# Patient Record
Sex: MALE | Race: WHITE | HISPANIC OR LATINO
[De-identification: names, ages, dates, MRNs, and addresses within clinical notes are randomized per-mention and may not be internally consistent; named-entity substitution may affect disease eponyms.]

---

## 2017-03-20 ENCOUNTER — APPOINTMENT (OUTPATIENT)
Dept: PULMONOLOGY | Facility: CLINIC | Age: 47
End: 2017-03-20

## 2017-03-20 VITALS
WEIGHT: 226 LBS | DIASTOLIC BLOOD PRESSURE: 71 MMHG | HEIGHT: 67 IN | BODY MASS INDEX: 35.47 KG/M2 | OXYGEN SATURATION: 98 % | RESPIRATION RATE: 16 BRPM | SYSTOLIC BLOOD PRESSURE: 110 MMHG | HEART RATE: 72 BPM | TEMPERATURE: 97.4 F

## 2017-03-20 DIAGNOSIS — Q76.1 KLIPPEL-FEIL SYNDROME: ICD-10-CM

## 2017-03-20 DIAGNOSIS — R07.89 OTHER CHEST PAIN: ICD-10-CM

## 2017-03-20 DIAGNOSIS — Z87.891 PERSONAL HISTORY OF NICOTINE DEPENDENCE: ICD-10-CM

## 2017-03-20 DIAGNOSIS — R03.0 ELEVATED BLOOD-PRESSURE READING, W/OUT DIAGNOSIS OF HYPERTENSION: ICD-10-CM

## 2017-03-20 DIAGNOSIS — R73.03 PREDIABETES.: ICD-10-CM

## 2017-03-20 DIAGNOSIS — Z82.49 FAMILY HISTORY OF ISCHEMIC HEART DISEASE AND OTHER DISEASES OF THE CIRCULATORY SYSTEM: ICD-10-CM

## 2017-03-20 DIAGNOSIS — G47.33 OBSTRUCTIVE SLEEP APNEA (ADULT) (PEDIATRIC): ICD-10-CM

## 2017-03-20 DIAGNOSIS — E66.01 MORBID (SEVERE) OBESITY DUE TO EXCESS CALORIES: ICD-10-CM

## 2017-04-10 ENCOUNTER — APPOINTMENT (OUTPATIENT)
Dept: CARDIOLOGY | Facility: CLINIC | Age: 47
End: 2017-04-10

## 2017-04-10 ENCOUNTER — OUTPATIENT (OUTPATIENT)
Dept: OUTPATIENT SERVICES | Facility: HOSPITAL | Age: 47
LOS: 1 days | End: 2017-04-10
Payer: COMMERCIAL

## 2017-04-10 DIAGNOSIS — Z00.8 ENCOUNTER FOR OTHER GENERAL EXAMINATION: ICD-10-CM

## 2017-04-10 DIAGNOSIS — Z98.89 OTHER SPECIFIED POSTPROCEDURAL STATES: Chronic | ICD-10-CM

## 2017-04-10 DIAGNOSIS — I25.10 ATHEROSCLEROTIC HEART DISEASE OF NATIVE CORONARY ARTERY WITHOUT ANGINA PECTORIS: ICD-10-CM

## 2017-04-10 PROCEDURE — 75574 CT ANGIO HRT W/3D IMAGE: CPT

## 2017-04-10 PROCEDURE — 75574 CT ANGIO HRT W/3D IMAGE: CPT | Mod: 26

## 2017-05-26 ENCOUNTER — OUTPATIENT (OUTPATIENT)
Dept: OUTPATIENT SERVICES | Facility: HOSPITAL | Age: 47
LOS: 1 days | End: 2017-05-26
Payer: COMMERCIAL

## 2017-05-26 ENCOUNTER — APPOINTMENT (OUTPATIENT)
Dept: SLEEP CENTER | Facility: CLINIC | Age: 47
End: 2017-05-26

## 2017-05-26 DIAGNOSIS — Z98.89 OTHER SPECIFIED POSTPROCEDURAL STATES: Chronic | ICD-10-CM

## 2017-05-26 PROCEDURE — G0399: CPT

## 2017-05-31 ENCOUNTER — RESULT REVIEW (OUTPATIENT)
Age: 47
End: 2017-05-31

## 2017-06-01 DIAGNOSIS — G47.33 OBSTRUCTIVE SLEEP APNEA (ADULT) (PEDIATRIC): ICD-10-CM

## 2023-12-06 ENCOUNTER — APPOINTMENT (OUTPATIENT)
Dept: PAIN MANAGEMENT | Facility: CLINIC | Age: 53
End: 2023-12-06
Payer: COMMERCIAL

## 2023-12-06 VITALS
HEART RATE: 61 BPM | WEIGHT: 192 LBS | HEIGHT: 67 IN | BODY MASS INDEX: 30.13 KG/M2 | DIASTOLIC BLOOD PRESSURE: 86 MMHG | SYSTOLIC BLOOD PRESSURE: 130 MMHG

## 2023-12-06 DIAGNOSIS — M54.12 RADICULOPATHY, CERVICAL REGION: ICD-10-CM

## 2023-12-06 DIAGNOSIS — G93.0 CEREBRAL CYSTS: ICD-10-CM

## 2023-12-06 PROCEDURE — 20553 NJX 1/MLT TRIGGER POINTS 3/>: CPT

## 2023-12-06 PROCEDURE — 99205 OFFICE O/P NEW HI 60 MIN: CPT | Mod: 25

## 2023-12-06 RX ORDER — TIZANIDINE 2 MG/1
2 TABLET ORAL
Qty: 90 | Refills: 3 | Status: ACTIVE | COMMUNITY
Start: 2023-12-06 | End: 1900-01-01

## 2023-12-06 RX ORDER — DICLOFENAC SODIUM 75 MG/1
75 TABLET, DELAYED RELEASE ORAL
Qty: 60 | Refills: 3 | Status: ACTIVE | COMMUNITY
Start: 2023-12-06 | End: 1900-01-01

## 2023-12-14 ENCOUNTER — APPOINTMENT (OUTPATIENT)
Dept: MRI IMAGING | Facility: CLINIC | Age: 53
End: 2023-12-14
Payer: COMMERCIAL

## 2023-12-14 ENCOUNTER — OUTPATIENT (OUTPATIENT)
Dept: OUTPATIENT SERVICES | Facility: HOSPITAL | Age: 53
LOS: 1 days | End: 2023-12-14
Payer: COMMERCIAL

## 2023-12-14 DIAGNOSIS — Z98.89 OTHER SPECIFIED POSTPROCEDURAL STATES: Chronic | ICD-10-CM

## 2023-12-14 DIAGNOSIS — G93.0 CEREBRAL CYSTS: ICD-10-CM

## 2023-12-14 PROCEDURE — 70553 MRI BRAIN STEM W/O & W/DYE: CPT

## 2023-12-14 PROCEDURE — 70553 MRI BRAIN STEM W/O & W/DYE: CPT | Mod: 26

## 2023-12-14 PROCEDURE — A9585: CPT

## 2024-02-16 ENCOUNTER — APPOINTMENT (OUTPATIENT)
Dept: PAIN MANAGEMENT | Facility: CLINIC | Age: 54
End: 2024-02-16
Payer: COMMERCIAL

## 2024-02-16 VITALS
HEART RATE: 60 BPM | WEIGHT: 187 LBS | BODY MASS INDEX: 28.34 KG/M2 | DIASTOLIC BLOOD PRESSURE: 95 MMHG | HEIGHT: 68 IN | SYSTOLIC BLOOD PRESSURE: 145 MMHG

## 2024-02-16 PROCEDURE — 99214 OFFICE O/P EST MOD 30 MIN: CPT | Mod: 25

## 2024-02-16 PROCEDURE — 20553 NJX 1/MLT TRIGGER POINTS 3/>: CPT

## 2024-02-16 NOTE — PROCEDURE
[FreeTextEntry1] : The procedure risks, hazards and alternatives were discussed with the patient and appropriate consent was obtained. The area over the myofascial spasm / pain was prepped with alcohol utilizing sterile technique. After isolating it between two palpating fingertips a 27 gauge 1.5 needle was placed in the center of the myofascial spasm and a negative aspiration was performed. A total of 8  mL of 1% Lidocaine mixed with Kenalog 40 mg was injected into 8 sites B/L C/Traps/thoracic. The patient tolerated procedure well without any apparent difficulties or complications.   MAE GILMORE was monitored in the office for 10 minutes after injections and tolerated procedure well without adverse events.

## 2024-02-16 NOTE — ASSESSMENT
[FreeTextEntry1] : 54 y/o y/o RH male with chronic neck/upper back pain c/w myofascial neck as well as cervical/thoracic radiculopathy. On exam limited ROM neck, + cervical and thoracic paraspinal muscle tenderness with spasm with trigger point, + trap spasm/trigger points. decreased sensation pp/LT Left UE . Myofascial and radicular neck and upper thoracic region. Responding to TPI.    MRI brain with and w/out IV contrast report reviewed and discussed with patient on this visit.   -TPI B/L cervical /traps and upper thoracic performed today without AE  -Diclofenac 75 mg 2x/day with meals prn.  -Tizanidine 2 mg q hs hs prn and increase to 4 mg qhs prn if needed.  -start PT -continue HEP  -fu in 12 weeks   The patient had the opportunity to ask questions and all were answered to their satisfaction.  The patient verbalized understanding of the management plan and agreed with our recommendations.

## 2024-02-16 NOTE — PHYSICAL EXAM
[FreeTextEntry1] : Constitutional: No signs of distress. No signs of toxicity.  Head/Neck/spine: Examination of the cervical spine reveals slightly limited range of motion in the neck, worse with roatation to right, no midline tenderness, + B/L paraspinal muscle tenderness and trap tendereness with spasm and trigger points, no facet tenderness. + Thoracic paraspinal muscle tenderness.  Examination of the lumbar spine reveals normal range of motion including flexion, extension and lateral rotation. No midline tenderness, no paraspinal muscle tenderness, negative facet loading,  no tenderness of sciatic notch, no tenderness of bilateral greater trochanters, MS: Alert and well oriented. Speech fluent. No aphasia. Fund of knowledge intact.  Psychiatric: Mood stable. Motor: Adequate bulk, tone, strength. 5/5 strength DTR: : 2+ b/l biceps, 2+ triceps, 2 + brachioradialis, 2+ patellar, and 2+ ankle reflexes. Plantar responses were flexor bilaterally, no clonus Sensory: intact to primary and secondary modalities;  Cerebellar and gait: intact Eyes: no redness or swelling Pulmonary: no respiratory distress Vascular: no temperature, color changes; no edema Musculoskeletal:  no joint deformities ,  no scoliosis, lordosis, kyphosis Skin: No rash.

## 2024-02-16 NOTE — HISTORY OF PRESENT ILLNESS
[FreeTextEntry1] : MAE GILMORE is a 53 year RH male with a Pmhx of obesity s/p bariatric surgery8/2022 with 70 lbs weight loss, pre-DM, cervical radiculopathy with chronic neck pain  12-14 mid shoulder blade region after throwing a tire who presents today for follow up.  On his initial visit he had TPI which he feels has decreased pain levels by 60%.   He is now having constant pain base of head down to mid back described as an ache or tightness 4/10 (previously 9-10/10), no longer radiating to both wrist, no numbness or tingling, denies changes to bowel or bladder.    He has seen multiple different providers including Neuro and Pain management prescribed meds without help. He has had acupuncture, massage, Physical therapy.  Massage therapy most helpful but transient.    Allergies: NKDA  Prior medications: Current medications: Advil, MVI   Social Hx : He is  and lives in [place], his wife JULISA ROBERTS is a patient of mine. 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   He does not smoke or vape. He drinks one glass of wine per day.  Denies illicits.

## 2024-05-13 ENCOUNTER — APPOINTMENT (OUTPATIENT)
Dept: PAIN MANAGEMENT | Facility: CLINIC | Age: 54
End: 2024-05-13
Payer: COMMERCIAL

## 2024-05-13 PROCEDURE — 99214 OFFICE O/P EST MOD 30 MIN: CPT | Mod: 25

## 2024-05-13 PROCEDURE — 20553 NJX 1/MLT TRIGGER POINTS 3/>: CPT

## 2024-05-13 NOTE — HISTORY OF PRESENT ILLNESS
[FreeTextEntry1] : MAE GILMORE is a 53 year RH male with a Pmhx of obesity s/p bariatric surgery8/2022 with 70 lbs weight loss, pre-DM, cervical radiculopathy with chronic neck pain  12-14 mid shoulder blade region after throwing a tire who presents today for follow up.  Responding well to TPI  He is now having constant pain base of head down to mid back described as an ache or tightness 4/10 (previously 9-10/10), no longer radiating to both wrist, no numbness or tingling, denies changes to bowel or bladder.    He has seen multiple different providers including Neuro and Pain management prescribed meds without help. He has had acupuncture, massage, Physical therapy.  Massage therapy most helpful but transient.    Allergies: NKDA  Prior medications: Current medications: Advil, MVI   Social Hx : He is  and lives in [place], his wife JULISA ROBERTS is a patient of mine. 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   He does not smoke or vape. He drinks one glass of wine per day.  Denies illicits.

## 2024-05-13 NOTE — PROCEDURE
[FreeTextEntry1] : The procedure risks, hazards and alternatives were discussed with the patient and appropriate consent was obtained. The area over the myofascial spasm / pain was prepped with alcohol utilizing sterile technique. After isolating it between two palpating fingertips a 27 gauge 1.5 needle was placed in the center of the myofascial spasm and a negative aspiration was performed. A total of 8  mL of 1% Lidocaine mixed with Kenalog 40 mg was injected into 12 sites B/L C/Traps/thoracic. The patient tolerated procedure well without any apparent difficulties or complications.   MAE GILMORE was monitored in the office for 10 minutes after injections and tolerated procedure well without adverse events.

## 2024-05-13 NOTE — ASSESSMENT
[FreeTextEntry1] : 52 y/o y/o RH male with chronic neck/upper back pain c/w myofascial neck as well as cervical/thoracic radiculopathy. On exam limited ROM neck, + cervical and thoracic paraspinal muscle tenderness with spasm with trigger point, + trap spasm/trigger points. decreased sensation pp/LT Left UE . Myofascial and radicular neck and upper thoracic region. Responding to TPI.    MRI brain with and w/out IV contrast report reviewed and discussed with patient on this visit.   -TPI B/L cervical /traps and upper thoracic performed today without AE  -Diclofenac 75 mg 2x/day with meals prn.  -Tizanidine 2 mg q hs hs prn and increase to 4 mg qhs prn if needed.  -start PT -continue HEP  -fu in 12 weeks   The patient had the opportunity to ask questions and all were answered to their satisfaction.  The patient verbalized understanding of the management plan and agreed with our recommendations.

## 2024-08-07 ENCOUNTER — APPOINTMENT (OUTPATIENT)
Dept: PAIN MANAGEMENT | Facility: CLINIC | Age: 54
End: 2024-08-07

## 2025-05-13 ENCOUNTER — APPOINTMENT (OUTPATIENT)
Dept: PAIN MANAGEMENT | Facility: CLINIC | Age: 55
End: 2025-05-13
Payer: COMMERCIAL

## 2025-05-13 VITALS
BODY MASS INDEX: 29.55 KG/M2 | DIASTOLIC BLOOD PRESSURE: 97 MMHG | HEART RATE: 73 BPM | SYSTOLIC BLOOD PRESSURE: 155 MMHG | HEIGHT: 68 IN | WEIGHT: 195 LBS

## 2025-05-13 PROCEDURE — 20553 NJX 1/MLT TRIGGER POINTS 3/>: CPT

## 2025-05-13 PROCEDURE — 99214 OFFICE O/P EST MOD 30 MIN: CPT | Mod: 25
